# Patient Record
Sex: FEMALE | Race: WHITE | URBAN - METROPOLITAN AREA
[De-identification: names, ages, dates, MRNs, and addresses within clinical notes are randomized per-mention and may not be internally consistent; named-entity substitution may affect disease eponyms.]

---

## 2018-04-20 NOTE — PATIENT DISCUSSION
Patient advised of the right to post-operative care by the surgeon. Patient is fully informed of, and agreed to, co-management with their primary optometric physician. Post-operative care by the surgeon is not medically necessary and co-management is clinically appropriate. Patient has received itemization of fees related to cataract surgery. Transfer of care letter completed for the patient. Transfer care of Right eye to Dr. Ricky Mitchell on 4/20/2018. Patient instructed to call immediately if any new distortion, blurring, decreased vision or eye pain.

## 2018-04-23 NOTE — PATIENT DISCUSSION
3 day PO: Patient is doing well post-operatively. The importance of post-op drop compliance was emphasized. Drop schedule reviewed with patient. Patient to call if any visual changes or concerns.

## 2018-05-03 NOTE — PATIENT DISCUSSION
1.5 week PO: Patient is doing well post-operatively. The importance of post-op drop compliance was emphasized. Drop scheduled reviewed with patient. Patient to call if any visual changes or concerns.

## 2018-05-07 NOTE — PATIENT DISCUSSION
2.5 week PO: Patient is doing well post-operatively. The importance of post-op drop compliance was emphasized. Drop scheduled reviewed with patient. Patient to call if any visual changes or concerns.

## 2018-08-21 NOTE — PATIENT DISCUSSION
ALSO DISCUSSED RLE MF AS POSSIBLE OPTION.   DR Michelle Laurent WOULD HAVE TO AGREE BASEDO N GOMEZ ETC.

## 2018-09-18 NOTE — PATIENT DISCUSSION
I have discussed the indications, alternatives, potential benefits and risks including, but not limited to, infection, duct irritation, discomfort, excessive tearing and/or reinsertion. After instillation of topical anesthesia and gentle stretching of the inferior puncta, a punctal plug was placed in RIGHT lower lid. The patient tolerated the procedure well.

## 2018-09-18 NOTE — PATIENT DISCUSSION
CATARACTS, OU- NOT VISUALLY SINGIFICANT. OPT OF [id number].  PT DESIRES TO CONSIDER HIS OPTIONS

## 2018-09-18 NOTE — PATIENT DISCUSSION
ADV AS HIS CATARACTS CONT TO GROW, IT WILL CONT TO CHANGE THE RX OF HIS EYE WHICH WILL ULITMATELY CANCEL OUT HIS PRK IN THE FUTURE AND CREATE FURTHER REFRACTIVE ERROR.

## 2018-09-18 NOTE — PATIENT DISCUSSION
SCHIRMER TEST TODAY SHOWS POOR TEAR PRODUCTION OD - One [employer] Ted INSERTION OF 80 DAY  PUNCTAL PLUGS INTO LOWER PUNCTUM TODAY.

## 2021-02-08 NOTE — PATIENT DISCUSSION
General: Physical Therapy Daily Treatment Note    Date: 2021  Patient Name: Krys Gusman Sr.  : 1933   MRN: [id number]  DOInjury: ~2020   DOSx: --  Referring Provider: Tanna Salcido MD  [address],  [address] [facility]     Medical Diagnosis:      Diagnosis Orders   1. Spondylosis of lumbosacral region, unspecified spinal osteoarthritis complication status     2. Osteoarthritis of right hip, unspecified osteoarthritis type         S: See eval  O:   Time [id number]       Visit       Pain Pain at rest 6/10  Pain with activity 9/10       ROM        Modalities         Ice vs heat PRN   MO   Exercise         Bridging 2-leg 3 x 10   TE   S-lying hip ABD 3 x 10 Add weight as sarabjit TE   Clamshell   unable due to pain. Try again next visit. Add resistance band as able      SLR vs LAQ Watch SLR for signs of aggravation. Step-ups FWD   TE   Step-ups LAT    TE   Calf raises     TE       TE             A:  Tolerated well. Above added to written HEP. Discussed anatomy, physiology, body mechanics, principles of loading, and progressive loading/activity. Walking and stretching (particularly SKTC) aggravate his symptoms. Recommended he stop these for now. Also recommended he stop crossing his leg when he sits (his position throughout the PT evaluation) and to not lie on his R side.     P: Continue with rehab plan  Stephen Melgoza PT    Treatment Charges: Mins Units   Initial Evaluation 35 1   Re-Evaluation     Ther Exercise         TE 10 1   Manual Therapy     MT     Ther Activities        TA     Gait Training          GT     Neuro Re-education NR     Modalities     Non-Billable Service Time     Other     Total Time/Units 45 2

## 2021-03-23 NOTE — PATIENT DISCUSSION
S/P LASIK OU; CATARACTS, OU - NOT VISUALLY SIGNIFICANT. DISC OPTION OF [id number]. GLASSES RX GIVEN TO FILL IF DESIRES. RE-EVALUATE IN 1 YEAR.  DISCUSSED OPTION OF RLE OD - PT TO CONSIDER IT.

## 2021-06-30 ENCOUNTER — NEW PATIENT COMPREHENSIVE (OUTPATIENT)
Dept: URBAN - METROPOLITAN AREA CLINIC 53 | Facility: CLINIC | Age: 81
End: 2021-06-30

## 2021-06-30 DIAGNOSIS — H25.813: ICD-10-CM

## 2021-06-30 DIAGNOSIS — H35.3130: ICD-10-CM

## 2021-06-30 DIAGNOSIS — H52.03: ICD-10-CM

## 2021-06-30 DIAGNOSIS — H40.1221: ICD-10-CM

## 2021-06-30 PROCEDURE — 92004 COMPRE OPH EXAM NEW PT 1/>: CPT

## 2021-06-30 PROCEDURE — 92015 DETERMINE REFRACTIVE STATE: CPT

## 2021-06-30 RX ORDER — DORZOLAMIDE HYDROCHLORIDE AND TIMOLOL MALEATE 20; 5 MG/ML; MG/ML: 1 SOLUTION/ DROPS OPHTHALMIC TWICE A DAY

## 2021-06-30 ASSESSMENT — VISUAL ACUITY
OU_CC: J1
OD_GLARE: 20/40
OS_GLARE: 20/25
OD_GLARE: 20/40
OS_GLARE: 20/30
OD_CC: 20/30-2
OS_CC: 20/30-2

## 2021-06-30 ASSESSMENT — TONOMETRY
OD_IOP_MMHG: 14
OS_IOP_MMHG: 14

## 2021-07-21 ENCOUNTER — DIAGNOSTIC TESTING ONLY (OUTPATIENT)
Dept: URBAN - METROPOLITAN AREA CLINIC 53 | Facility: CLINIC | Age: 81
End: 2021-07-21

## 2021-07-21 DIAGNOSIS — H40.1221: ICD-10-CM

## 2021-07-21 PROCEDURE — 92083 EXTENDED VISUAL FIELD XM: CPT

## 2021-07-21 PROCEDURE — 92133 CPTRZD OPH DX IMG PST SGM ON: CPT

## 2021-07-21 PROCEDURE — 76514 ECHO EXAM OF EYE THICKNESS: CPT

## 2021-07-27 ENCOUNTER — DIAGNOSTIC TESTING ONLY (OUTPATIENT)
Dept: URBAN - METROPOLITAN AREA CLINIC 48 | Facility: CLINIC | Age: 81
End: 2021-07-27

## 2021-07-27 DIAGNOSIS — H47.233: ICD-10-CM

## 2021-07-27 PROCEDURE — 92273 FULL FIELD ERG W/I&R: CPT

## 2021-09-22 ENCOUNTER — FOLLOW UP (OUTPATIENT)
Dept: URBAN - METROPOLITAN AREA CLINIC 53 | Facility: CLINIC | Age: 81
End: 2021-09-22

## 2021-09-22 DIAGNOSIS — H40.1221: ICD-10-CM

## 2021-09-22 PROCEDURE — 92012 INTRM OPH EXAM EST PATIENT: CPT

## 2021-09-22 ASSESSMENT — TONOMETRY
OD_IOP_MMHG: 15
OS_IOP_MMHG: 16
OD_IOP_MMHG: 15
OS_IOP_MMHG: 17

## 2021-09-22 ASSESSMENT — VISUAL ACUITY
OD_CC: 20/40
OS_CC: 20/30
OD_PH: 20/30

## 2022-01-11 NOTE — PATIENT DISCUSSION
Recent Vitals  T(C): 36.8 (01-10-22 @ 13:20), Max: 36.8 (01-10-22 @ 13:20)  HR: 127 (01-10-22 @ 13:20) (127 - 127)  BP: 121/85 (01-10-22 @ 13:20) (121/85 - 121/85)  RR: 20 (01-10-22 @ 13:20) (20 - 20)  SpO2: 97% (01-10-22 @ 13:20) (97% - 97%)                        8.8    4.25  )-----------( 389      ( 10 Dimitrios 2022 22:39 )             28.9     01-10    137  |  102  |  8   ----------------------------<  138<H>  4.0   |  26  |  0.71    Ca    9.1      10 Dimitrios 2022 22:39    TPro  7.6  /  Alb  3.6  /  TBili  0.5  /  DBili  x   /  AST  17  /  ALT  12  /  AlkPhos  97  01-10      LIVER FUNCTIONS - ( 10 Dimitrios 2022 22:39 )  Alb: 3.6 g/dL / Pro: 7.6 gm/dL / ALK PHOS: 97 U/L / ALT: 12 U/L / AST: 17 U/L / GGT: x           Urinalysis Basic - ( 10 Dimitrios 2022 21:25 )    Color: Yellow / Appearance: Clear / S.010 / pH: x  Gluc: x / Ketone: Moderate  / Bili: Negative / Urobili: Negative mg/dL   Blood: x / Protein: 15 mg/dL / Nitrite: Negative   Leuk Esterase: Small / RBC: x / WBC 3-5   Sq Epi: x / Non Sq Epi: Few / Bacteria: Occasional        Home Medications:  acetaminophen 325 mg oral tablet: 3 tab(s) orally every 8 hours, As Needed - 3) (10 Dimitrios 2022 18:54)  aspirin 81 mg oral delayed release tablet: 1 tab(s) orally once a day (10 Dimitrios 2022 18:54)  gabapentin 100 mg oral capsule: 1 cap(s) orally 3 times a day (10 Dimitrios 2022 18:54)  melatonin 3 mg oral tablet: 1 tab(s) orally once a day (at bedtime) (2021 10:30)  morphine 60 mg/12 hours oral tablet, extended release: 1 tab(s) orally 2 times a day (2021 22:29)  polyethylene glycol 3350 oral powder for reconstitution: 17 gram(s) orally once a day (2021 10:30)  prednisoLONE acetate 1% ophthalmic suspension: 1 drop(s) to each affected eye 4 times a day (2021 10:30) Tasked to Dr. Sun Dodd to review and interpret.

## 2022-02-09 ENCOUNTER — FOLLOW UP (OUTPATIENT)
Dept: URBAN - METROPOLITAN AREA CLINIC 53 | Facility: CLINIC | Age: 82
End: 2022-02-09

## 2022-02-09 DIAGNOSIS — H25.813: ICD-10-CM

## 2022-02-09 DIAGNOSIS — H40.1221: ICD-10-CM

## 2022-02-09 DIAGNOSIS — H35.3131: ICD-10-CM

## 2022-02-09 PROCEDURE — 92014 COMPRE OPH EXAM EST PT 1/>: CPT

## 2022-02-09 PROCEDURE — 92134 CPTRZ OPH DX IMG PST SGM RTA: CPT

## 2022-02-09 ASSESSMENT — VISUAL ACUITY
OD_CC: 20/30-1
OU_CC: J2
OS_CC: 20/25-2
OD_GLARE: 20/40
OS_GLARE: 20/40
OD_PH: 20/25-1
OD_GLARE: 20/50
OS_GLARE: 20/40

## 2022-02-09 ASSESSMENT — TONOMETRY
OS_IOP_MMHG: 16
OD_IOP_MMHG: 15
OS_IOP_MMHG: 15
OD_IOP_MMHG: 15

## 2022-02-09 NOTE — PATIENT DISCUSSION
Optic Nerve Hemorrhage OS resolved. IOP stable without drop therapy OD; borderline stable with current drop therapy OS. Continue Cosopt BID OS. Discussed starting additional medication OS vs observation. Patient elects to continue current drop therapy and re-evaluate in 5 months.

## 2022-07-12 ENCOUNTER — FOLLOW UP (OUTPATIENT)
Dept: URBAN - METROPOLITAN AREA CLINIC 53 | Facility: CLINIC | Age: 82
End: 2022-07-12

## 2022-07-12 DIAGNOSIS — H40.1221: ICD-10-CM

## 2022-07-12 PROCEDURE — 92133 CPTRZD OPH DX IMG PST SGM ON: CPT

## 2022-07-12 PROCEDURE — 92083 EXTENDED VISUAL FIELD XM: CPT

## 2022-07-12 PROCEDURE — 92012 INTRM OPH EXAM EST PATIENT: CPT

## 2022-07-12 ASSESSMENT — TONOMETRY
OS_IOP_MMHG: 15
OD_IOP_MMHG: 14
OD_IOP_MMHG: 14
OS_IOP_MMHG: 14

## 2022-07-12 ASSESSMENT — VISUAL ACUITY
OS_CC: 20/25
OD_PH: 20/25
OU_CC: 20/25+2
OD_CC: 20/30

## 2022-07-12 NOTE — PATIENT DISCUSSION
VISUALLY SIGNIFICANT/ DEFERS: Informed patient that their cataract is visually significant and that surgery is recommended to improve patient's visual acuity and/or glare symptoms. RBAs of surgery discussed and questions answered. Patient defers surgery at this time. Will continue to monitor regularly for progression. Patient can call to schedule biometry and surgery within the next 6 months if desired. [de-identified] : well healed scar [Midline] : located in midline position [Normal] : orientation to person, place, and time: normal

## 2022-07-12 NOTE — PATIENT DISCUSSION
Previous Optic Nerve Hemorrhage OS resolved. IOP stable without drop therapy OD; borderline stable with current drop therapy OS. Continue Cosopt BID OS.

## 2022-07-12 NOTE — PATIENT DISCUSSION
IOP 14/14, adjusted 14/15. Tmax per JONATHAN notes, 15/16. OCT RNFL (07/2022) WNL OU, stable OD, improved OS. HVF (07/2022) edge defects OD>OS, ? secondary to lens artifact. Recommend repeat at patient's convenience.

## 2022-07-18 ENCOUNTER — TECH ONLY (OUTPATIENT)
Dept: URBAN - METROPOLITAN AREA CLINIC 53 | Facility: CLINIC | Age: 82
End: 2022-07-18

## 2022-07-18 DIAGNOSIS — H52.4: ICD-10-CM

## 2022-07-18 PROCEDURE — 92015 DETERMINE REFRACTIVE STATE: CPT

## 2022-07-18 ASSESSMENT — VISUAL ACUITY
OS_CC: 20/25-2
OD_CC: 20/25-2

## 2023-01-17 ENCOUNTER — COMPREHENSIVE EXAM (OUTPATIENT)
Dept: URBAN - METROPOLITAN AREA CLINIC 53 | Facility: CLINIC | Age: 83
End: 2023-01-17

## 2023-01-17 DIAGNOSIS — H35.3131: ICD-10-CM

## 2023-01-17 DIAGNOSIS — H47.233: ICD-10-CM

## 2023-01-17 DIAGNOSIS — H25.813: ICD-10-CM

## 2023-01-17 DIAGNOSIS — H40.1221: ICD-10-CM

## 2023-01-17 PROCEDURE — 92083 EXTENDED VISUAL FIELD XM: CPT

## 2023-01-17 PROCEDURE — 92014 COMPRE OPH EXAM EST PT 1/>: CPT

## 2023-01-17 PROCEDURE — 92133 CPTRZD OPH DX IMG PST SGM ON: CPT

## 2023-01-17 ASSESSMENT — VISUAL ACUITY
OD_CC: 20/25
OS_GLARE: 20/40
OD_GLARE: 20/50
OD_GLARE: 20/30
OU_CC: J1+
OS_CC: 20/25
OU_CC: 20/25+1
OS_GLARE: 20/30

## 2023-01-17 ASSESSMENT — TONOMETRY
OS_IOP_MMHG: 14
OD_IOP_MMHG: 14

## 2023-07-10 ENCOUNTER — FOLLOW UP (OUTPATIENT)
Dept: URBAN - METROPOLITAN AREA CLINIC 53 | Facility: CLINIC | Age: 83
End: 2023-07-10

## 2023-07-10 DIAGNOSIS — H40.1221: ICD-10-CM

## 2023-07-10 PROCEDURE — 92012 INTRM OPH EXAM EST PATIENT: CPT

## 2023-07-10 ASSESSMENT — TONOMETRY
OS_IOP_MMHG: 16
OD_IOP_MMHG: 15
OD_IOP_MMHG: 15
OS_IOP_MMHG: 17

## 2023-07-10 ASSESSMENT — VISUAL ACUITY
OD_CC: 20/25-2
OS_CC: 20/25-1

## 2024-01-15 ENCOUNTER — COMPREHENSIVE EXAM (OUTPATIENT)
Dept: URBAN - METROPOLITAN AREA CLINIC 53 | Facility: CLINIC | Age: 84
End: 2024-01-15

## 2024-01-15 DIAGNOSIS — H40.1221: ICD-10-CM

## 2024-01-15 DIAGNOSIS — H52.4: ICD-10-CM

## 2024-01-15 DIAGNOSIS — H25.813: ICD-10-CM

## 2024-01-15 DIAGNOSIS — H35.3131: ICD-10-CM

## 2024-01-15 PROCEDURE — 92014 COMPRE OPH EXAM EST PT 1/>: CPT

## 2024-01-15 PROCEDURE — 92083 EXTENDED VISUAL FIELD XM: CPT

## 2024-01-15 PROCEDURE — 92133 CPTRZD OPH DX IMG PST SGM ON: CPT

## 2024-01-15 PROCEDURE — 92015 DETERMINE REFRACTIVE STATE: CPT

## 2024-01-15 PROCEDURE — 92134 CPTRZ OPH DX IMG PST SGM RTA: CPT

## 2024-01-15 ASSESSMENT — VISUAL ACUITY
OU_CC: J1+/-
OS_CC: 20/30
OS_PH: 20/25
OD_CC: 20/25-2

## 2024-01-15 ASSESSMENT — TONOMETRY
OS_IOP_MMHG: 16
OD_IOP_MMHG: 14
OD_IOP_MMHG: 14
OS_IOP_MMHG: 15

## 2024-07-16 ENCOUNTER — FOLLOW UP (OUTPATIENT)
Dept: URBAN - METROPOLITAN AREA CLINIC 53 | Facility: CLINIC | Age: 84
End: 2024-07-16

## 2024-07-16 DIAGNOSIS — H40.1221: ICD-10-CM

## 2024-07-16 PROCEDURE — 99213 OFFICE O/P EST LOW 20 MIN: CPT

## 2024-07-16 PROCEDURE — 92133 CPTRZD OPH DX IMG PST SGM ON: CPT

## 2024-07-16 ASSESSMENT — TONOMETRY
OS_IOP_MMHG: 14
OS_IOP_MMHG: 13
OD_IOP_MMHG: 12
OD_IOP_MMHG: 12

## 2024-07-16 ASSESSMENT — VISUAL ACUITY
OD_CC: 20/25-2 PUSH
OD_PH: 20/25
OS_CC: 20/30+1

## 2024-10-24 ENCOUNTER — EMERGENCY VISIT (OUTPATIENT)
Dept: URBAN - METROPOLITAN AREA CLINIC 53 | Facility: CLINIC | Age: 84
End: 2024-10-24

## 2024-10-24 DIAGNOSIS — H04.123: ICD-10-CM

## 2024-10-24 DIAGNOSIS — H25.813: ICD-10-CM

## 2024-10-24 PROCEDURE — 99212 OFFICE O/P EST SF 10 MIN: CPT

## 2025-02-13 ENCOUNTER — COMPREHENSIVE EXAM (OUTPATIENT)
Age: 85
End: 2025-02-13

## 2025-02-13 DIAGNOSIS — H52.4: ICD-10-CM

## 2025-02-13 DIAGNOSIS — H40.1221: ICD-10-CM

## 2025-02-13 DIAGNOSIS — H35.3131: ICD-10-CM

## 2025-02-13 DIAGNOSIS — H25.813: ICD-10-CM

## 2025-02-13 DIAGNOSIS — H04.123: ICD-10-CM

## 2025-02-13 PROCEDURE — 99214 OFFICE O/P EST MOD 30 MIN: CPT

## 2025-02-13 PROCEDURE — 92134 CPTRZ OPH DX IMG PST SGM RTA: CPT

## 2025-02-13 PROCEDURE — 92015 DETERMINE REFRACTIVE STATE: CPT

## 2025-02-28 NOTE — PATIENT DISCUSSION
4 week PO: Patient is doing well post-operatively. The importance of post-op drop compliance was emphasized. Drop scheduled reviewed with patient. Patient to call if any visual changes or concerns. [Normal] : Developmental history within normal limits

## 2025-03-26 ENCOUNTER — TECH ONLY (OUTPATIENT)
Age: 85
End: 2025-03-26

## 2025-03-26 DIAGNOSIS — H52.4: ICD-10-CM

## 2025-03-26 PROCEDURE — 92015GRNC REFRACTION GLASSES RECHECK NO CHARGE

## 2025-08-05 ENCOUNTER — DIAGNOSTICS ONLY (OUTPATIENT)
Age: 85
End: 2025-08-05

## 2025-08-05 DIAGNOSIS — H40.1221: ICD-10-CM

## 2025-08-05 PROCEDURE — 92083 EXTENDED VISUAL FIELD XM: CPT

## 2025-08-05 PROCEDURE — 92133 CPTRZD OPH DX IMG PST SGM ON: CPT
